# Patient Record
Sex: FEMALE | Race: WHITE | Employment: STUDENT | URBAN - METROPOLITAN AREA
[De-identification: names, ages, dates, MRNs, and addresses within clinical notes are randomized per-mention and may not be internally consistent; named-entity substitution may affect disease eponyms.]

---

## 2017-01-10 ENCOUNTER — OFFICE VISIT (OUTPATIENT)
Dept: PRIMARY CARE CLINIC | Age: 22
End: 2017-01-10

## 2017-01-10 VITALS
BODY MASS INDEX: 21.1 KG/M2 | HEIGHT: 68 IN | DIASTOLIC BLOOD PRESSURE: 69 MMHG | WEIGHT: 139.2 LBS | SYSTOLIC BLOOD PRESSURE: 111 MMHG | OXYGEN SATURATION: 97 % | RESPIRATION RATE: 16 BRPM | TEMPERATURE: 98.2 F | HEART RATE: 66 BPM

## 2017-01-10 DIAGNOSIS — R21 RASH AND NONSPECIFIC SKIN ERUPTION: Primary | ICD-10-CM

## 2017-01-10 NOTE — PROGRESS NOTES
Chief Complaint   Patient presents with    Rash     c/o rash on R wrist that goes around 1st digit of finger,states she has been using lotion to help with discomfort    sx x 1 week, at this time, pt does not have pcp

## 2017-01-10 NOTE — MR AVS SNAPSHOT
Visit Information Date & Time Provider Department Dept. Phone Encounter #  
 1/10/2017  3:30 PM Austyn Colmenares, [address] [phone] [id number] Follow-up Instructions Return if symptoms worsen or fail to improve. Upcoming Health Maintenance Date Due Hepatitis A Peds Age 1-18 (1 of 2 - Standard Series) 4/2/1998 HPV AGE 9Y-26Y (3 of 3 - Female 3 Dose Series) 9/15/2013 INFLUENZA AGE 9 TO ADULT 8/1/2016 DTaP/Tdap/Td series (7 - Td) 3/18/2018 Allergies as of 1/10/2017  Review Complete On: 1/10/2017 By: Austyn Colmenares NP Severity Noted Reaction Type Reactions Hydrogen Peroxide  07/04/2014    Rash Current Immunizations  Reviewed on 3/22/2016 Name Date DTaP 8/19/2002, 8/10/1998, 1997, 1997, 1997 HPV (9-valent) 5/15/2013, 5/9/2012 Hep B Vaccine 5/28/1998, 1997, 1997 Hib 8/10/1998, 1997, 1997, 1997 MMR 3/22/2016, 1/5/2016 Meningococcal (MCV4P) Vaccine 1/5/2016 Poliovirus vaccine 8/19/2002, 5/28/1998, 1997, 1997 Tdap 3/18/2008 Varicella Virus Vaccine 1/21/2003 Not reviewed this visit You Were Diagnosed With   
  
 Codes Comments Rash and nonspecific skin eruption    -  Primary ICD-10-CM: R21 
ICD-9-CM: 782.1 Vitals BP Pulse Temp Resp Height(growth percentile) Weight(growth percentile) 111/69 (47 %/ 65 %)* 66 98.2 °F (36.8 °C) (Oral) 16 5' 8\" (1.727 m) (93 %, Z= 1.46) 139 lb 3.2 oz (63.1 kg) (68 %, Z= 0.47) LMP SpO2 BMI OB Status Smoking Status 12/05/2016 97% 21.17 kg/m2 (44 %, Z= -0.16) Having regular periods Never Smoker *BP percentiles are based on NHBPEP's 4th Report Growth percentiles are based on CDC 2-20 Years data. BMI and BSA Data Body Mass Index Body Surface Area  
 21.17 kg/m 2 1.74 m 2 Preferred Pharmacy Pharmacy Name Phone [address] [place] [address] [place] Oliva Said [phone] Your Updated Medication List  
  
Notice  As of 1/10/2017  4:22 PM  
 You have not been prescribed any medications. We Performed the Following REFERRAL TO DERMATOLOGY [REF19 Custom] Comments:  
 Please evaluate patient for skin rash Follow-up Instructions Return if symptoms worsen or fail to improve. Referral Information Referral ID Referred By Referred To  
  
 [id number] Anam MEDINA  [facility], [place]   
   [address] Phone: [phone] Fax: [phone] Visits Status Start Date End Date 1 New Request 1/10/17 1/10/18 If your referral has a status of pending review or denied, additional information will be sent to support the outcome of this decision. Patient Instructions Rash: Care Instructions Your Care Instructions A rash is any irritation or inflammation of the skin. Rashes have many possible causes, including allergy, infection, illness, heat, and emotional stress. Follow-up care is a key part of your treatment and safety. Be sure to make and go to all appointments, and call your doctor if you are having problems. Its also a good idea to know your test results and keep a list of the medicines you take. How can you care for yourself at home? · Wash the area with water only. Soap can make dryness and itching worse. Pat dry. · Put cold, wet cloths on the rash to reduce itching. · Keep cool, and stay out of the sun. · Leave the rash open to the air as much of the time as possible. · Sometimes petroleum jelly (Vaseline) can help relieve the discomfort caused by a rash. A moisturizing lotion, such as Cetaphil, also may help. Calamine lotion may help for rashes caused by contact with something (such as a plant or soap) that irritated the skin. Use it 3 or 4 times a day. · If your doctor prescribed a cream, use it as directed. If your doctor prescribed medicine, take it exactly as directed. · If your rash itches so badly that it interferes with your normal activities, take an over-the-counter antihistamine, such as diphenhydramine (Benadryl) or loratadine (Claritin). Read and follow all instructions on the label. When should you call for help? Call your doctor now or seek immediate medical care if: 
· You have signs of infection, such as: 
¨ Increased pain, swelling, warmth, or redness. ¨ Red streaks leading from the area. ¨ Pus draining from the area. ¨ A fever. · You have joint pain along with the rash. Watch closely for changes in your health, and be sure to contact your doctor if: 
· Your rash is changing or getting worse. For example, call if you have pain along with the rash, the rash is spreading, or you have new blisters. · You do not get better after 1 week. Where can you learn more? Go to http://kirstin-andrea.info/. Enter C772 in the search box to learn more about \"Rash: Care Instructions. \" Current as of: February 5, 2016 Content Version: 11.1 © 1991-3759 [employer]BOT. Care instructions adapted under license by [employer] (which disclaims liability or warranty for this information). If you have questions about a medical condition or this instruction, always ask your healthcare professional. [place] [address] any warranty or liability for your use of this information. Introducing [facility]! Kelli Wood introduces Selftrade patient portal. Now you can access parts of your medical record, email your doctor's office, and request medication refills online. 1. In your internet browser, go to [email or web address] 2. Click on the First Time User? Click Here link in the Sign In box. You will see the New Member Sign Up page. 3. Enter your WellFX Access Code exactly as it appears below. You will not need to use this code after youve completed the sign-up process. If you do not sign up before the expiration date, you must request a new code. · WellFX Access Code: [email or web address] Expires: 4/10/2017  4:22 PM 
 
4. Enter the last four digits of your Social Security Number (xxxx) and Date of Birth (mm/dd/yyyy) as indicated and click Submit. You will be taken to the next sign-up page. 5. Create a WellFX ID. This will be your WellFX login ID and cannot be changed, so think of one that is secure and easy to remember. 6. Create a WellFX password. You can change your password at any time. 7. Enter your Password Reset Question and Answer. This can be used at a later time if you forget your password. 8. Enter your e-mail address. You will receive e-mail notification when new information is available in [address] 9. Click Sign Up. You can now view and download portions of your medical record. 10. Click the Download Summary menu link to download a portable copy of your medical information. If you have questions, please visit the Frequently Asked Questions section of the WellFX website. Remember, WellFX is NOT to be used for urgent needs. For medical emergencies, dial 911. Now available from your iPhone and Android! Please provide this summary of care documentation to your next provider. If you have any questions after today's visit, please call [phone].

## 2017-01-10 NOTE — PATIENT INSTRUCTIONS

## 2017-01-11 NOTE — PROGRESS NOTES
Chief Complaint   Patient presents with    Rash     c/o rash on R wrist that goes around 1st digit of finger,states she has been using lotion to help with discomfort      she is a 25y.o. year old female who presents for evalution. She has red raised spots on her hands, forearms, side, back and neck. Spots are not blisters. no drainage noted, Her mother states she has eczema, and also gets the same spots every winter from dry skin, however, this winter they are worse. Dermatology was following in the past.    Reviewed PmHx, RxHx, FmHx, SocHx, AllgHx and updated and dated in the chart. Review of Systems - negative except as listed above in the HPI    Objective:     Vitals:    01/10/17 1541   BP: 111/69   Pulse: 66   Resp: 16   Temp: 98.2 °F (36.8 °C)   TempSrc: Oral   SpO2: 97%   Weight: 139 lb 3.2 oz (63.1 kg)   Height: 5' 8\" (1.727 m)       No current outpatient prescriptions on file. No current facility-administered medications for this visit. Physical Examination: General appearance - alert, well appearing, and in no distress  Chest - clear to auscultation, no wheezes, rales or rhonchi, symmetric air entry  Heart - normal rate, regular rhythm, normal S1, S2, no murmurs, rubs, clicks or gallops  Skin - LESIONS NOTED: Rash to bilat hands, arms, few spots on the right side, neck, back. Red, raised, itching, going away then returning in different areas. Assessment/ Plan:   Jane was seen today for rash. Diagnoses and all orders for this visit:    Rash and nonspecific skin eruption  -     REFERRAL TO DERMATOLOGY       Follow-up Disposition:  Return if symptoms worsen or fail to improve. I have discussed the diagnosis with the patient and the intended plan as seen in the above orders. The patient has received an after-visit summary and questions were answered concerning future plans. Pt conveyed understanding of plan.     Medication Side Effects and Warnings were discussed with patient      Stefania Sparrow, NP

## 2017-02-06 ENCOUNTER — OFFICE VISIT (OUTPATIENT)
Dept: PRIMARY CARE CLINIC | Age: 22
End: 2017-02-06

## 2017-02-06 VITALS
HEART RATE: 62 BPM | HEIGHT: 68 IN | BODY MASS INDEX: 21.16 KG/M2 | RESPIRATION RATE: 16 BRPM | TEMPERATURE: 98.2 F | WEIGHT: 139.6 LBS | DIASTOLIC BLOOD PRESSURE: 65 MMHG | OXYGEN SATURATION: 99 % | SYSTOLIC BLOOD PRESSURE: 111 MMHG

## 2017-02-06 DIAGNOSIS — J06.9 VIRAL UPPER RESPIRATORY TRACT INFECTION: Primary | ICD-10-CM

## 2017-02-06 PROBLEM — L30.9 ECZEMA: Status: ACTIVE | Noted: 2017-01-10

## 2017-02-06 LAB
QUICKVUE INFLUENZA TEST: NEGATIVE
S PYO AG THROAT QL: NEGATIVE
VALID INTERNAL CONTROL?: YES
VALID INTERNAL CONTROL?: YES

## 2017-02-06 RX ORDER — HYDROCODONE POLISTIREX AND CHLORPHENIRAMINE POLISTIREX 10; 8 MG/5ML; MG/5ML
1 SUSPENSION, EXTENDED RELEASE ORAL
Qty: 120 ML | Refills: 0 | Status: SHIPPED | OUTPATIENT
Start: 2017-02-06 | End: 2018-03-10 | Stop reason: ALTCHOICE

## 2017-02-06 NOTE — PATIENT INSTRUCTIONS
Rest.  Force fluids. Use steam or humidification to help open your nasal & sinus passages. Honey-lemon tea may help as well as throat sprays or lozenges. Decongestants may help if congested. Upper Respiratory Infection (Cold): Care Instructions  Your Care Instructions    An upper respiratory infection, or URI, is an infection of the nose, sinuses, or throat. URIs are spread by coughs, sneezes, and direct contact. The common cold is the most frequent kind of URI. The flu and sinus infections are other kinds of URIs. Almost all URIs are caused by viruses. Antibiotics won't cure them. But you can treat most infections with home care. This may include drinking lots of fluids and taking over-the-counter pain medicine. You will probably feel better in 4 to 10 days. The doctor has checked you carefully, but problems can develop later. If you notice any problems or new symptoms, get medical treatment right away. Follow-up care is a key part of your treatment and safety. Be sure to make and go to all appointments, and call your doctor if you are having problems. It's also a good idea to know your test results and keep a list of the medicines you take. How can you care for yourself at home? · To prevent dehydration, drink plenty of fluids, enough so that your urine is light yellow or clear like water. Choose water and other caffeine-free clear liquids until you feel better. If you have kidney, heart, or liver disease and have to limit fluids, talk with your doctor before you increase the amount of fluids you drink. · Take an over-the-counter pain medicine, such as acetaminophen (Tylenol), ibuprofen (Advil, Motrin), or naproxen (Aleve). Read and follow all instructions on the label. · Before you use cough and cold medicines, check the label. These medicines may not be safe for young children or for people with certain health problems.   · Be careful when taking over-the-counter cold or flu medicines and Tylenol at the same time. Many of these medicines have acetaminophen, which is Tylenol. Read the labels to make sure that you are not taking more than the recommended dose. Too much acetaminophen (Tylenol) can be harmful. · Get plenty of rest.  · Do not smoke or allow others to smoke around you. If you need help quitting, talk to your doctor about stop-smoking programs and medicines. These can increase your chances of quitting for good. When should you call for help? Call 911 anytime you think you may need emergency care. For example, call if:  · You have severe trouble breathing. Call your doctor now or seek immediate medical care if:  · You seem to be getting much sicker. · You have new or worse trouble breathing. · You have a new or higher fever. · You have a new rash. Watch closely for changes in your health, and be sure to contact your doctor if:  · You have a new symptom, such as a sore throat, an earache, or sinus pain. · You cough more deeply or more often, especially if you notice more mucus or a change in the color of your mucus. · You do not get better as expected. Where can you learn more? Go to http://kirstin-andrea.info/. Enter H097 in the search box to learn more about \"Upper Respiratory Infection (Cold): Care Instructions. \"  Current as of: June 30, 2016  Content Version: 11.1  © 4592-0112 Healthwise, Incorporated. Care instructions adapted under license by [employer] (which disclaims liability or warranty for this information). If you have questions about a medical condition or this instruction, always ask your healthcare professional. [place] [address] any warranty or liability for your use of this information.

## 2017-02-06 NOTE — PROGRESS NOTES
Chief Complaint   Patient presents with    Sore Throat   c/o sore throat and glands swollen x 1 day, has tried motrin and tylenol to help with discomfort

## 2017-02-06 NOTE — LETTER
NOTIFICATION RETURN TO WORK / SCHOOL 
 
2/6/2017 1:19 PM 
 
Ms. [address] [facility] To Whom It May Concern: 
 
Jane KIRK [place] Willi is currently under the care of [address] [place] [address]. She will return to work/school on: 02/07/2017 If there are questions or concerns please have the patient contact our office. Sincerely, Henrry Mccloud MD

## 2017-02-06 NOTE — MR AVS SNAPSHOT
Visit Information Date & Time Provider Department Dept. Phone Encounter #  
 2/6/2017 12:30 PM Levon Beltran, [address] [phone] Follow-up Instructions Return if symptoms worsen or fail to improve. Upcoming Health Maintenance Date Due Hepatitis A Peds Age 1-18 (1 of 2 - Standard Series) 4/2/1998 HPV AGE 9Y-26Y (3 of 3 - Female 3 Dose Series) 9/15/2013 INFLUENZA AGE 9 TO ADULT 8/1/2016 DTaP/Tdap/Td series (7 - Td) 3/18/2018 Allergies as of 2/6/2017  Review Complete On: 2/6/2017 By: Levon Beltran MD  
  
 Severity Noted Reaction Type Reactions Hydrogen Peroxide  07/04/2014    Rash Current Immunizations  Reviewed on 3/22/2016 Name Date DTaP 8/19/2002, 8/10/1998, 1997, 1997, 1997 HPV (9-valent) 5/15/2013, 5/9/2012 Hep B Vaccine 5/28/1998, 1997, 1997 Hib 8/10/1998, 1997, 1997, 1997 MMR 3/22/2016, 1/5/2016 Meningococcal (MCV4P) Vaccine 1/5/2016 Poliovirus vaccine 8/19/2002, 5/28/1998, 1997, 1997 Tdap 3/18/2008 Varicella Virus Vaccine 1/21/2003 Not reviewed this visit You Were Diagnosed With   
  
 Codes Comments Viral upper respiratory tract infection    -  Primary ICD-10-CM: J06.9, B97.89 ICD-9-CM: 465.9 Vitals BP Pulse Temp Resp Height(growth percentile) Weight(growth percentile) 111/65 (48 %/ 51 %)* 62 98.2 °F (36.8 °C) (Oral) 16 5' 8\" (1.727 m) (93 %, Z= 1.46) 139 lb 9.6 oz (63.3 kg) (69 %, Z= 0.48) LMP SpO2 BMI OB Status Smoking Status 02/01/2017 (Exact Date) 99% 21.23 kg/m2 (44 %, Z= -0.15) Having regular periods Never Smoker *BP percentiles are based on NHBPEP's 4th Report Growth percentiles are based on CDC 2-20 Years data. BMI and BSA Data Body Mass Index Body Surface Area  
 21.23 kg/m 2 1.74 m 2 Preferred Pharmacy Pharmacy Name Phone [address] [place] [address] [place] Oliva Said [phone] Your Updated Medication List  
  
   
This list is accurate as of: 2/6/17  1:19 PM.  Always use your most recent med list.  
  
  
  
  
 chlorpheniramine-HYDROcodone 10-8 mg/5 mL suspension Commonly known as:  Terryann Deiters Take 5 mL by mouth every twelve (12) hours as needed for Cough. Max Daily Amount: 10 mL. Prescriptions Printed Refills  
 chlorpheniramine-HYDROcodone (TUSSIONEX) 10-8 mg/5 mL suspension 0 Sig: Take 5 mL by mouth every twelve (12) hours as needed for Cough. Max Daily Amount: 10 mL. Class: Print Route: Oral  
  
We Performed the Following AMB POC RAPID INFLUENZA TEST [91211 CPT(R)] AMB POC RAPID STREP A [12536 CPT(R)] Follow-up Instructions Return if symptoms worsen or fail to improve. Patient Instructions Rest.  Force fluids. Use steam or humidification to help open your nasal & sinus passages. Honey-lemon tea may help as well as throat sprays or lozenges. Decongestants may help if congested. Upper Respiratory Infection (Cold): Care Instructions Your Care Instructions An upper respiratory infection, or URI, is an infection of the nose, sinuses, or throat. URIs are spread by coughs, sneezes, and direct contact. The common cold is the most frequent kind of URI. The flu and sinus infections are other kinds of URIs. Almost all URIs are caused by viruses. Antibiotics won't cure them. But you can treat most infections with home care. This may include drinking lots of fluids and taking over-the-counter pain medicine. You will probably feel better in 4 to 10 days. The doctor has checked you carefully, but problems can develop later. If you notice any problems or new symptoms, get medical treatment right away. Follow-up care is a key part of your treatment and safety.  Be sure to make and go to all appointments, and call your doctor if you are having problems. It's also a good idea to know your test results and keep a list of the medicines you take. How can you care for yourself at home? · To prevent dehydration, drink plenty of fluids, enough so that your urine is light yellow or clear like water. Choose water and other caffeine-free clear liquids until you feel better. If you have kidney, heart, or liver disease and have to limit fluids, talk with your doctor before you increase the amount of fluids you drink. · Take an over-the-counter pain medicine, such as acetaminophen (Tylenol), ibuprofen (Advil, Motrin), or naproxen (Aleve). Read and follow all instructions on the label. · Before you use cough and cold medicines, check the label. These medicines may not be safe for young children or for people with certain health problems. · Be careful when taking over-the-counter cold or flu medicines and Tylenol at the same time. Many of these medicines have acetaminophen, which is Tylenol. Read the labels to make sure that you are not taking more than the recommended dose. Too much acetaminophen (Tylenol) can be harmful. · Get plenty of rest. 
· Do not smoke or allow others to smoke around you. If you need help quitting, talk to your doctor about stop-smoking programs and medicines. These can increase your chances of quitting for good. When should you call for help? Call 911 anytime you think you may need emergency care. For example, call if: 
· You have severe trouble breathing. Call your doctor now or seek immediate medical care if: 
· You seem to be getting much sicker. · You have new or worse trouble breathing. · You have a new or higher fever. · You have a new rash. Watch closely for changes in your health, and be sure to contact your doctor if: 
· You have a new symptom, such as a sore throat, an earache, or sinus pain. · You cough more deeply or more often, especially if you notice more mucus or a change in the color of your mucus. · You do not get better as expected. Where can you learn more? Go to http://kirstin-andrea.info/. Enter X542 in the search box to learn more about \"Upper Respiratory Infection (Cold): Care Instructions. \" Current as of: June 30, 2016 Content Version: 11.1 © 9418-9669 [email or web address].com. Care instructions adapted under license by [employer] (which disclaims liability or warranty for this information). If you have questions about a medical condition or this instruction, always ask your healthcare professional. [address] any warranty or liability for your use of this information. Introducing [facility]! Justin Dorman introduces AisleFinder patient portal. Now you can access parts of your medical record, email your doctor's office, and request medication refills online. 1. In your internet browser, go to [email or web address] 2. Click on the First Time User? Click Here link in the Sign In box. You will see the New Member Sign Up page. 3. Enter your AisleFinder Access Code exactly as it appears below. You will not need to use this code after youve completed the sign-up process. If you do not sign up before the expiration date, you must request a new code. · AisleFinder Access Code: [email or web address] Expires: 4/10/2017  4:22 PM 
 
4. Enter the last four digits of your Social Security Number (xxxx) and Date of Birth (mm/dd/yyyy) as indicated and click Submit. You will be taken to the next sign-up page. 5. Create a CBRITEt ID. This will be your AisleFinder login ID and cannot be changed, so think of one that is secure and easy to remember. 6. Create a AisleFinder password. You can change your password at any time. 7. Enter your Password Reset Question and Answer.  This can be used at a later time if you forget your password. 8. Enter your e-mail address. You will receive e-mail notification when new information is available in [address] 9. Click Sign Up. You can now view and download portions of your medical record. 10. Click the Download Summary menu link to download a portable copy of your medical information. If you have questions, please visit the Frequently Asked Questions section of the IncreaseCard website. Remember, IncreaseCard is NOT to be used for urgent needs. For medical emergencies, dial 911. Now available from your iPhone and Android! Please provide this summary of care documentation to your next provider. If you have any questions after today's visit, please call [phone].

## 2017-02-06 NOTE — PROGRESS NOTES
Chief Complaint   Patient presents with    Sore Throat       HPI:  25year old female who developed sore throat, subjective fevers and chills last night along with cough, myalgias, malaise, and fatigue. She has not gotten a flu shot this year. Appetite a little less, but no nausea or vomiting. Review of Systems - no recent weight loss/gain, chest pain, shortness of breath, nausea, vomiting, diarrhea, urinary frequency/urgency/dysuria. Otherwise, ROS negative except as per HPI    Past Medical History   Diagnosis Date    Spider bite 7/2014       Past Surgical History   Procedure Laterality Date    Hx heent       ear tubes       Family History   Problem Relation Age of Onset    Asthma Mother     Elevated Lipids Father     No Known Problems Sister     No Known Problems Brother     No Known Problems Maternal Aunt     No Known Problems Maternal Uncle     No Known Problems Paternal Aunt     No Known Problems Paternal Uncle     Diabetes Maternal Grandmother     Hypertension Maternal Grandfather     Hypertension Paternal Grandmother        Social History     Social History    Marital status: SINGLE     Spouse name: N/A    Number of children: N/A    Years of education: N/A     Occupational History    Not on file. Social History Main Topics    Smoking status: Never Smoker    Smokeless tobacco: Never Used    Alcohol use Yes      Comment: occasional    Drug use: No    Sexual activity: Yes     Birth control/ protection: Pill     Other Topics Concern    Not on file     Social History Narrative       No current outpatient prescriptions on file prior to visit. No current facility-administered medications on file prior to visit. Allergies   Allergen Reactions    Hydrogen Peroxide Rash       PE:    General:  Well-developed, well-nourished female in no apparent distress  HEENT:  Normocephalic, atraumatic, Pupils are equal, round, & reactive to light & accommodation.   Extraocular movements intact. TM's normal, external auditory exam normal.  No sinus tenderness. Oropharynx grossly normal.  No tonsillar enlargement, erythema, or exudates. Neck:  Supple, nontender, full ROM. No lymphadenopathy. No thyromegaly. Chest:  clear to auscultation without rales, rhonchi, or wheezes. CV:  Regular rate & rhythm without murmurs, gallops, clicks, or rubs. Abdomen:  soft, nontender, nondistended, normoactive bowel sounds, no organomegaly. Extremities:  No edema, clubbing, or cyanosis. Full ROM, nontender. Orders Placed This Encounter    AMB POC RAPID INFLUENZA TEST    AMB POC RAPID STREP A    chlorpheniramine-HYDROcodone (TUSSIONEX) 10-8 mg/5 mL suspension     Sig: Take 5 mL by mouth every twelve (12) hours as needed for Cough. Max Daily Amount: 10 mL. Dispense:  120 mL     Refill:  0   POC flu & Strep are both negative    1. Viral upper respiratory tract infection    - AMB POC RAPID INFLUENZA TEST  - AMB POC RAPID STREP A  - chlorpheniramine-HYDROcodone (TUSSIONEX) 10-8 mg/5 mL suspension; Take 5 mL by mouth every twelve (12) hours as needed for Cough. Max Daily Amount: 10 mL. Dispense: 120 mL; Refill: 0    Follow-up Disposition:  Return if symptoms worsen or fail to improve.     Bryce Fitzgerald MD

## 2018-03-10 ENCOUNTER — OFFICE VISIT (OUTPATIENT)
Dept: PRIMARY CARE CLINIC | Age: 23
End: 2018-03-10

## 2018-03-10 VITALS
OXYGEN SATURATION: 99 % | HEART RATE: 62 BPM | BODY MASS INDEX: 21.25 KG/M2 | TEMPERATURE: 98 F | RESPIRATION RATE: 17 BRPM | SYSTOLIC BLOOD PRESSURE: 122 MMHG | DIASTOLIC BLOOD PRESSURE: 70 MMHG | WEIGHT: 140.2 LBS | HEIGHT: 68 IN

## 2018-03-10 DIAGNOSIS — S53.401A SPRAIN OF RIGHT ELBOW, INITIAL ENCOUNTER: Primary | ICD-10-CM

## 2018-03-10 NOTE — PROGRESS NOTES
Chief Complaint   Patient presents with    Elbow Pain   pt c/o not being able to extend R elbow and intermittent R elbow pain x 3-4 days, pt denies any injury to arm, pt states she has been taking ibuprofen to help with discomfort. This note will not be viewable in [address]

## 2018-03-10 NOTE — PROGRESS NOTES
Subjective:      Jane Ortiz is a 23 y.o. female seen for evaluation and treatment of a right elbow injury. This is evaluated as a personal injury. The injury was sustained 4 days ago, and occurred while she was sleeping. She did not hear or sense a pop or snap at the time of the injury. She is not sure how it was injured, and possibly injured it in her sleep. The patient notes pain and mild swelling since the injury. She has not injured this site in the past. After 4 days, she took one dose of Motrin 400 mg, and it has not resolved so she is here today for evaluation and treatment. She has not needed to stop doing her daily activities or work, although she has been unable to put her hair up in a ponytail.     Patient Active Problem List   Diagnosis Code    Eczema L30.9     Patient Active Problem List    Diagnosis Date Noted    Eczema 01/10/2017       Allergies   Allergen Reactions    Hydrogen Peroxide Rash     Past Medical History:   Diagnosis Date    Spider bite 7/2014     Past Surgical History:   Procedure Laterality Date    HX HEENT      ear tubes     Family History   Problem Relation Age of Onset    Asthma Mother     Elevated Lipids Father     No Known Problems Sister     No Known Problems Brother     No Known Problems Maternal Aunt     No Known Problems Maternal Uncle     No Known Problems Paternal Aunt     No Known Problems Paternal Uncle     Diabetes Maternal Grandmother     Hypertension Maternal Grandfather     Hypertension Paternal Grandmother      Social History   Substance Use Topics    Smoking status: Never Smoker    Smokeless tobacco: Never Used    Alcohol use Yes      Comment: occasional        Objective:     Visit Vitals    /70 (BP 1 Location: Left arm, BP Patient Position: Sitting)    Pulse 62    Temp 98 °F (36.7 °C) (Oral)    Resp 17    Ht 5' 8\" (1.727 m)    Wt 140 lb 3.2 oz (63.6 kg)    SpO2 99%    BMI 21.32 kg/m2      Appearance: alert, well appearing, and in no distress. Musculoskeletal exam: abnormal exam of right elbow: no swelling noted or visible however patient states it feels swollen. She is unable to fully extend without pain in the elbow. Imaging  is not indicated    Assessment/Plan:       ICD-10-CM ICD-9-CM    1. Sprain of right elbow, initial encounter S53.401A 841.9    Motrin 600 mg TID with food for 3-5 days, along with ice 4-6 x daily. Use arm splint if needed to rest and elevate. The patient is advised to use elbow splint, rest, apply cold or ice intermittently, elevate affected extremity, gradually reintroduce usual activities, avoid heavy lifting until better and return PRN if symptoms persist or worsen.

## 2018-03-10 NOTE — PATIENT INSTRUCTIONS
Elbow Sprain: Care Instructions  Your Care Instructions    An elbow sprain occurs when you overstretch or tear the ligaments around your elbow. Ligaments are the tough tissues that connect one bone to another. A sprain can happen when you fall or when you play sports or do chores around the house. Most sprains will heal with some treatment at home. Follow-up care is a key part of your treatment and safety. Be sure to make and go to all appointments, and call your doctor if you are having problems. It's also a good idea to know your test results and keep a list of the medicines you take. How can you care for yourself at home? · Follow your doctor's directions for wearing a splint, elbow pad, sling, or elastic bandage. Wrapping the elbow may help reduce or prevent swelling. · Rest and protect your elbow. Do not do any activity that hurts your elbow. · Apply ice or a cold pack to your elbow for 10 to 20 minutes at a time to reduce swelling. Try this every 1 to 2 hours for 3 days (when you are awake) or until the swelling goes down. Put a thin cloth between the ice and your skin. · After 2 or 3 days, if your swelling is gone, apply a heating pad on low or a warm cloth to your elbow. This helps keep your arm flexible. Some doctors suggest that you go back and forth between hot and cold. Keep the splint dry. · Prop up your elbow on pillows while you apply ice or anytime you sit or lie down. Try to keep the elbow at or above the level of your heart to help reduce swelling. · Take pain medicines exactly as directed. ¨ If the doctor gave you a prescription medicine for pain, take it as prescribed. ¨ If you are not taking a prescription pain medicine, ask your doctor if you can take an over-the-counter medicine. · Return to your usual level of activity slowly. When should you call for help? Call your doctor now or seek immediate medical care if:  · Your pain is worse.   · You have new or increased swelling in your elbow or hand. · You cannot bend your arm. · You have a fever. · Your elbow looks red. · You have tingling, weakness, or numbness in your elbow, hand, or fingers. Watch closely for changes in your health, and be sure to contact your doctor if:  · Your pain is not better after 2 weeks. Where can you learn more? Go to http://kirstin-andrea.info/. Enter R973 in the search box to learn more about \"Elbow Sprain: Care Instructions. \"  Current as of: March 21, 2017  Content Version: 11.4  © 2576-3081 [employer]. Care instructions adapted under license by [employer] (which disclaims liability or warranty for this information). If you have questions about a medical condition or this instruction, always ask your healthcare professional. [address] any warranty or liability for your use of this information.

## 2018-03-10 NOTE — MR AVS SNAPSHOT
[address] 
[phone] Patient: Tammy Hedrick MRN: [id number] :1995 Visit Information Date & Time Provider Department Dept. Phone Encounter #  
 3/10/2018  1:15 PM Maddie Kaur, [address] [facility] [phone] [id number] Follow-up Instructions Return if symptoms worsen or fail to improve. Upcoming Health Maintenance Date Due Hepatitis A Peds Age 1-18 (1 of 2 - Standard Series) 1998 HPV AGE 9Y-26Y (3 of 3 - Female 3 Dose Series) 9/15/2013 DTaP/Tdap/Td series (7 - Td) 3/18/2018 Allergies as of 3/10/2018  Review Complete On: 3/10/2018 By: Abad Haro LPN Severity Noted Reaction Type Reactions Hydrogen Peroxide  2014    Rash Current Immunizations  Reviewed on 3/22/2016 Name Date DTaP 2002, 8/10/1998, 1997, 1997, 1997 HPV (9-valent) 5/15/2013, 2012 Hep B Vaccine 1998, 1997, 1997 Hib 8/10/1998, 1997, 1997, 1997 MMR 3/22/2016, 2016 Meningococcal (MCV4P) Vaccine 2016 Poliovirus vaccine 2002, 1998, 1997, 1997 Tdap 3/18/2008 Varicella Virus Vaccine 2003 Not reviewed this visit You Were Diagnosed With   
  
 Codes Comments Sprain of right elbow, initial encounter    -  Primary ICD-10-CM: S53.401A ICD-9-CM: 247. 9 Vitals BP Pulse Temp Resp Height(growth percentile) Weight(growth percentile) 122/70 (BP 1 Location: Left arm, BP Patient Position: Sitting) 62 98 °F (36.7 °C) (Oral) 17 5' 8\" (1.727 m) 140 lb 3.2 oz (63.6 kg) SpO2 BMI OB Status Smoking Status 99% 21.32 kg/m2 Having regular periods Never Smoker Vitals History BMI and BSA Data Body Mass Index Body Surface Area  
 21.32 kg/m 2 1.75 m 2 Preferred Pharmacy Pharmacy Name Phone [facility] [address], [address] [place] [address] Kevin Corcoran [phone] Your Updated Medication List  
  
Notice  As of 3/10/2018  1:42 PM  
 You have not been prescribed any medications. Follow-up Instructions Return if symptoms worsen or fail to improve. Patient Instructions Elbow Sprain: Care Instructions Your Care Instructions An elbow sprain occurs when you overstretch or tear the ligaments around your elbow. Ligaments are the tough tissues that connect one bone to another. A sprain can happen when you fall or when you play sports or do chores around the house. Most sprains will heal with some treatment at home. Follow-up care is a key part of your treatment and safety. Be sure to make and go to all appointments, and call your doctor if you are having problems. It's also a good idea to know your test results and keep a list of the medicines you take. How can you care for yourself at home? · Follow your doctor's directions for wearing a splint, elbow pad, sling, or elastic bandage. Wrapping the elbow may help reduce or prevent swelling. · Rest and protect your elbow. Do not do any activity that hurts your elbow. · Apply ice or a cold pack to your elbow for 10 to 20 minutes at a time to reduce swelling. Try this every 1 to 2 hours for 3 days (when you are awake) or until the swelling goes down. Put a thin cloth between the ice and your skin. · After 2 or 3 days, if your swelling is gone, apply a heating pad on low or a warm cloth to your elbow. This helps keep your arm flexible. Some doctors suggest that you go back and forth between hot and cold. Keep the splint dry. · Prop up your elbow on pillows while you apply ice or anytime you sit or lie down. Try to keep the elbow at or above the level of your heart to help reduce swelling. · Take pain medicines exactly as directed.  
¨ If the doctor gave you a prescription medicine for pain, take it as prescribed. ¨ If you are not taking a prescription pain medicine, ask your doctor if you can take an over-the-counter medicine. · Return to your usual level of activity slowly. When should you call for help? Call your doctor now or seek immediate medical care if: 
· Your pain is worse. · You have new or increased swelling in your elbow or hand. · You cannot bend your arm. · You have a fever. · Your elbow looks red. · You have tingling, weakness, or numbness in your elbow, hand, or fingers. Watch closely for changes in your health, and be sure to contact your doctor if: 
· Your pain is not better after 2 weeks. Where can you learn more? Go to http://kirstin-andrea.info/. Enter Q439 in the search box to learn more about \"Elbow Sprain: Care Instructions. \" Current as of: March 21, 2017 Content Version: 11.4 © 2434-3377 [employer]. Care instructions adapted under license by [employer] (which disclaims liability or warranty for this information). If you have questions about a medical condition or this instruction, always ask your healthcare professional. [place] [address] any warranty or liability for your use of this information. Introducing [facility]! [facility] introduces Entrustet patient portal. Now you can access parts of your medical record, email your doctor's office, and request medication refills online. 1. In your internet browser, go to [email or web address] 2. Click on the First Time User? Click Here link in the Sign In box. You will see the New Member Sign Up page. 3. Enter your Entrustet Access Code exactly as it appears below. You will not need to use this code after youve completed the sign-up process. If you do not sign up before the expiration date, you must request a new code. · Entrustet Access Code: [email or web address] Expires: 6/8/2018  1:41 PM 
 
 4. Enter the last four digits of your Social Security Number (xxxx) and Date of Birth (mm/dd/yyyy) as indicated and click Submit. You will be taken to the next sign-up page. 5. Create a PsychSignal ID. This will be your PsychSignal login ID and cannot be changed, so think of one that is secure and easy to remember. 6. Create a PsychSignal password. You can change your password at any time. 7. Enter your Password Reset Question and Answer. This can be used at a later time if you forget your password. 8. Enter your e-mail address. You will receive e-mail notification when new information is available in [address] 9. Click Sign Up. You can now view and download portions of your medical record. 10. Click the Download Summary menu link to download a portable copy of your medical information. If you have questions, please visit the Frequently Asked Questions section of the PsychSignal website. Remember, PsychSignal is NOT to be used for urgent needs. For medical emergencies, dial 911. Now available from your iPhone and Android! Please provide this summary of care documentation to your next provider. If you have any questions after today's visit, please call [phone].

## 2018-11-19 ENCOUNTER — OFFICE VISIT (OUTPATIENT)
Dept: PRIMARY CARE CLINIC | Age: 23
End: 2018-11-19

## 2018-11-19 VITALS
HEIGHT: 68 IN | WEIGHT: 145.6 LBS | BODY MASS INDEX: 22.07 KG/M2 | HEART RATE: 56 BPM | DIASTOLIC BLOOD PRESSURE: 72 MMHG | SYSTOLIC BLOOD PRESSURE: 112 MMHG | TEMPERATURE: 97.8 F | OXYGEN SATURATION: 98 % | RESPIRATION RATE: 20 BRPM

## 2018-11-19 DIAGNOSIS — J06.9 VIRAL URI WITH COUGH: Primary | ICD-10-CM

## 2018-11-19 DIAGNOSIS — R05.9 COUGH: ICD-10-CM

## 2018-11-19 NOTE — LETTER
NOTIFICATION RETURN TO WORK / SCHOOL 
 
11/19/2018 11:55 AM 
 
Ms. [address] [employer] [facility] To Whom It May Concern: 
 
Jane Damon is currently under the care of [address] [place] [address]. She will return to work/school on: 11/21/18 If there are questions or concerns please have the patient contact our office.  
 
 
 
Sincerely, 
 
 
Rubi Marcus NP

## 2018-11-19 NOTE — PATIENT INSTRUCTIONS
Cough: Care Instructions  Your Care Instructions    A cough is your body's response to something that bothers your throat or airways. Many things can cause a cough. You might cough because of a cold or the flu, bronchitis, or asthma. Smoking, postnasal drip, allergies, and stomach acid that backs up into your throat also can cause coughs. A cough is a symptom, not a disease. Most coughs stop when the cause, such as a cold, goes away. You can take a few steps at home to cough less and feel better. Follow-up care is a key part of your treatment and safety. Be sure to make and go to all appointments, and call your doctor if you are having problems. It's also a good idea to know your test results and keep a list of the medicines you take. How can you care for yourself at home? · Drink lots of water and other fluids. This helps thin the mucus and soothes a dry or sore throat. Honey or lemon juice in hot water or tea may ease a dry cough. · Take cough medicine as directed by your doctor. · Prop up your head on pillows to help you breathe and ease a dry cough. · Try cough drops to soothe a dry or sore throat. Cough drops don't stop a cough. Medicine-flavored cough drops are no better than candy-flavored drops or hard candy. · Do not smoke. Avoid secondhand smoke. If you need help quitting, talk to your doctor about stop-smoking programs and medicines. These can increase your chances of quitting for good. When should you call for help? Call 911 anytime you think you may need emergency care.  For example, call if:    · You have severe trouble breathing.    Call your doctor now or seek immediate medical care if:    · You cough up blood.     · You have new or worse trouble breathing.     · You have a new or higher fever.     · You have a new rash.    Watch closely for changes in your health, and be sure to contact your doctor if:    · You cough more deeply or more often, especially if you notice more mucus or a change in the color of your mucus.     · You have new symptoms, such as a sore throat, an earache, or sinus pain.     · You do not get better as expected. Where can you learn more? Go to http://kirstin-andrea.info/. Enter D279 in the search box to learn more about \"Cough: Care Instructions. \"  Current as of: December 6, 2017  Content Version: 11.8  © 8835-6989 [employer]. Care instructions adapted under license by [employer] (which disclaims liability or warranty for this information). If you have questions about a medical condition or this instruction, always ask your healthcare professional. [address] any warranty or liability for your use of this information.

## 2018-11-20 NOTE — PROGRESS NOTES
Subjective:   Jane Ocampo is a 26 y.o. female who complains of hoarseness and loss of voice for 6 days. She denies a history of shortness of breath and wheezing. Her symptoms have gradually improved over the last 1 day. She denies fever. Patient does not smoke cigarettes. Relevant PMH: No pertinent additional PMH. Objective:      Visit Vitals  /72 (BP 1 Location: Left arm, BP Patient Position: Sitting)   Pulse (!) 56   Temp 97.8 °F (36.6 °C) (Oral)   Resp 20   Ht 5' 8\" (1.727 m)   Wt 145 lb 9.6 oz (66 kg)   SpO2 98%   BMI 22.14 kg/m²      Appears alert, well appearing, and in no distress. Ears: bilateral TM's and external ear canals normal  Oropharynx: erythematous  Neck: supple, no significant adenopathy  Lungs: clear to auscultation, no wheezes, rales or rhonchi, symmetric air entry  The abdomen is soft without tenderness or hepatosplenomegaly. Rapid Strep test is not done    Assessment/Plan:   viral upper respiratory illness  Per orders. Gargle, use acetaminophen or other OTC analgesic, and take Rx fully as prescribed. Call if other family members develop similar symptoms. See prn. ICD-10-CM ICD-9-CM    1. Viral URI with cough J06.9 465.9     B97.89     2. Cough R05 786.2 REFERRAL TO PRIMARY CARE   .

## 2019-06-12 ENCOUNTER — OFFICE VISIT (OUTPATIENT)
Dept: PRIMARY CARE CLINIC | Age: 24
End: 2019-06-12

## 2019-06-12 VITALS
BODY MASS INDEX: 21.67 KG/M2 | DIASTOLIC BLOOD PRESSURE: 54 MMHG | HEART RATE: 55 BPM | TEMPERATURE: 98.1 F | WEIGHT: 143 LBS | SYSTOLIC BLOOD PRESSURE: 96 MMHG | OXYGEN SATURATION: 97 % | HEIGHT: 68 IN | RESPIRATION RATE: 18 BRPM

## 2019-06-12 DIAGNOSIS — J06.9 VIRAL URI WITH COUGH: ICD-10-CM

## 2019-06-12 DIAGNOSIS — H69.81 ACUTE DYSFUNCTION OF RIGHT EUSTACHIAN TUBE: Primary | ICD-10-CM

## 2019-06-12 NOTE — PROGRESS NOTES
Subjective:   Jane Weller is a 27 y.o. female who complains of right ear pain, fullness, diminished hearing for 1 day, stable since that time. Going out of town and wanted to get checked. Pt reports having cough for 2 weeks. Had nasal congestion and sore throat as well which has since resolved. Denies any fevers. She denies a history of shortness of breath and wheezing. Evaluation to date: none. Treatment to date: OTC products. Patient does not smoke cigarettes. Relevant PMH:   Past Medical History:   Diagnosis Date    Spider bite 7/2014     Past Surgical History:   Procedure Laterality Date    HX HEENT      ear tubes     Allergies   Allergen Reactions    Hydrogen Peroxide Rash         Review of Systems  Pertinent items are noted in HPI. Objective:     Visit Vitals  BP 96/54 (BP 1 Location: Left arm, BP Patient Position: Sitting)   Pulse (!) 55   Temp 98.1 °F (36.7 °C) (Oral)   Resp 18   Ht 5' 8\" (1.727 m)   Wt 143 lb (64.9 kg)   LMP 06/09/2019   SpO2 97%   BMI 21.74 kg/m²     General:  alert, cooperative, no distress   Eyes: negative   Ears: normal TM's (mild scarring Right TM) and external ear canals AU   Sinuses: Normal paranasal sinuses without tenderness   Mouth:  Lips, mucosa, and tongue normal. Teeth and gums normal and normal findings: oropharynx pink & moist without lesions or evidence of thrush   Neck: supple, symmetrical, trachea midline and no adenopathy. Heart: S1 and S2 normal, no murmurs noted. Lungs: clear to auscultation bilaterally          Assessment/Plan:       ICD-10-CM ICD-9-CM    1. Acute dysfunction of right eustachian tube H69.81 381.81    2. Viral URI with cough J06.9 465.9     B97.89       Recommend decongestant & Flonase. Suggested symptomatic OTC remedies. RTC prn. Eliza Berger NP  This note will not be viewable in [address]

## 2019-06-12 NOTE — PROGRESS NOTES
Identified pt with two pt identifiers(name and ). Reviewed record in preparation for visit and have obtained necessary documentation. Chief Complaint   Patient presents with    Ear Pain     right ear 4/10      Visit Vitals  BP 96/54 (BP 1 Location: Left arm, BP Patient Position: Sitting)   Pulse (!) 55   Temp 98.1 °F (36.7 °C) (Oral)   Resp 18   Ht 5' 8\" (1.727 m)   Wt 143 lb (64.9 kg)   LMP 2019   SpO2 97%   BMI 21.74 kg/m²       Health Maintenance Due   Topic    HPV Age 9Y-34Y (4 - Female 3-dose series)    DTaP/Tdap/Td series (7 - Td)    PAP AKA CERVICAL CYTOLOGY        Coordination of Care Questionnaire:  :   1) Have you been to an emergency room, urgent care, or hospitalized since your last visit? If yes, where when, and reason for visit? no       2. Have seen or consulted any other health care provider since your last visit? If yes, where when, and reason for visit? NO      3) Do you have an Advanced Directive/ Living Will in place? NO  If yes, do we have a copy on file NO  If no, would you like information NO    Patient is accompanied by self I have received verbal consent from Grace Barton to discuss any/all medical information while they are present in the room.

## 2019-06-12 NOTE — PATIENT INSTRUCTIONS
Viral Respiratory Infection: Care Instructions Your Care Instructions Viruses are very small organisms. They grow in number after they enter your body. There are many types that cause different illnesses, such as colds and the mumps. The symptoms of a viral respiratory infection often start quickly. They include a fever, sore throat, and runny nose. You may also just not feel well. Or you may not want to eat much. Most viral respiratory infections are not serious. They usually get better with time and self-care. Antibiotics are not used to treat a viral infection. That's because antibiotics will not help cure a viral illness. In some cases, antiviral medicine can help your body fight a serious viral infection. Follow-up care is a key part of your treatment and safety. Be sure to make and go to all appointments, and call your doctor if you are having problems. It's also a good idea to know your test results and keep a list of the medicines you take. How can you care for yourself at home? · Rest as much as possible until you feel better. · Be safe with medicines. Take your medicine exactly as prescribed. Call your doctor if you think you are having a problem with your medicine. You will get more details on the specific medicine your doctor prescribes. · Take an over-the-counter pain medicine, such as acetaminophen (Tylenol), ibuprofen (Advil, Motrin), or naproxen (Aleve), as needed for pain and fever. Read and follow all instructions on the label. Do not give aspirin to anyone younger than 20. It has been linked to Reye syndrome, a serious illness. · Drink plenty of fluids, enough so that your urine is light yellow or clear like water. Hot fluids, such as tea or soup, may help relieve congestion in your nose and throat. If you have kidney, heart, or liver disease and have to limit fluids, talk with your doctor before you increase the amount of fluids you drink. · Try to clear mucus from your lungs by breathing deeply and coughing. · Gargle with warm salt water once an hour. This can help reduce swelling and throat pain. Use 1 teaspoon of salt mixed in 1 cup of warm water. · Do not smoke or allow others to smoke around you. If you need help quitting, talk to your doctor about stop-smoking programs and medicines. These can increase your chances of quitting for good. To avoid spreading the virus · Cough or sneeze into a tissue. Then throw the tissue away. · If you don't have a tissue, use your hand to cover your cough or sneeze. Then clean your hand. You can also cough into your sleeve. · Wash your hands often. Use soap and warm water. Wash for 15 to 20 seconds each time. · If you don't have soap and water near you, you can clean your hands with alcohol wipes or gel. When should you call for help? Call your doctor now or seek immediate medical care if: 
  · You have a new or higher fever.  
  · Your fever lasts more than 48 hours.  
  · You have trouble breathing.  
  · You have a fever with a stiff neck or a severe headache.  
  · You are sensitive to light.  
  · You feel very sleepy or confused.  
 Watch closely for changes in your health, and be sure to contact your doctor if: 
  · You do not get better as expected. Where can you learn more? Go to http://kirstin-andrea.info/. Enter P570 in the search box to learn more about \"Viral Respiratory Infection: Care Instructions. \" Current as of: September 5, 2018 Content Version: 11.9 © 9329-4173 [employer]). Care instructions adapted under license by [employer] (which disclaims liability or warranty for this information). If you have questions about a medical condition or this instruction, always ask your healthcare professional. [address] any warranty or liability for your use of this information.

## 2019-06-28 ENCOUNTER — OFFICE VISIT (OUTPATIENT)
Dept: PRIMARY CARE CLINIC | Age: 24
End: 2019-06-28

## 2019-06-28 VITALS
RESPIRATION RATE: 16 BRPM | HEART RATE: 62 BPM | HEIGHT: 68 IN | SYSTOLIC BLOOD PRESSURE: 101 MMHG | OXYGEN SATURATION: 98 % | TEMPERATURE: 98.2 F | DIASTOLIC BLOOD PRESSURE: 64 MMHG | BODY MASS INDEX: 22.19 KG/M2 | WEIGHT: 146.4 LBS

## 2019-06-28 DIAGNOSIS — R10.30 LOWER ABDOMINAL PAIN: ICD-10-CM

## 2019-06-28 DIAGNOSIS — N30.00 ACUTE CYSTITIS WITHOUT HEMATURIA: Primary | ICD-10-CM

## 2019-06-28 LAB
BILIRUB UR QL STRIP: NEGATIVE
GLUCOSE UR-MCNC: NEGATIVE MG/DL
KETONES P FAST UR STRIP-MCNC: NEGATIVE MG/DL
PH UR STRIP: 6 [PH] (ref 4.6–8)
PROT UR QL STRIP: NEGATIVE
SP GR UR STRIP: 1.02 (ref 1–1.03)
UA UROBILINOGEN AMB POC: NORMAL (ref 0.2–1)
URINALYSIS CLARITY POC: NORMAL
URINALYSIS COLOR POC: YELLOW
URINE BLOOD POC: NORMAL
URINE LEUKOCYTES POC: NORMAL
URINE NITRITES POC: NEGATIVE

## 2019-06-28 RX ORDER — CEPHALEXIN 500 MG/1
500 CAPSULE ORAL 2 TIMES DAILY
Qty: 14 CAP | Refills: 0 | Status: SHIPPED | OUTPATIENT
Start: 2019-06-28 | End: 2019-07-05

## 2019-06-28 NOTE — PATIENT INSTRUCTIONS
Urinary Tract Infection in Women: Care Instructions Your Care Instructions A urinary tract infection, or UTI, is a general term for an infection anywhere between the kidneys and the urethra (where urine comes out). Most UTIs are bladder infections. They often cause pain or burning when you urinate. UTIs are caused by bacteria and can be cured with antibiotics. Be sure to complete your treatment so that the infection goes away. Follow-up care is a key part of your treatment and safety. Be sure to make and go to all appointments, and call your doctor if you are having problems. It's also a good idea to know your test results and keep a list of the medicines you take. How can you care for yourself at home? · Take your antibiotics as directed. Do not stop taking them just because you feel better. You need to take the full course of antibiotics. · Drink extra water and other fluids for the next day or two. This may help wash out the bacteria that are causing the infection. (If you have kidney, heart, or liver disease and have to limit fluids, talk with your doctor before you increase your fluid intake.) · Avoid drinks that are carbonated or have caffeine. They can irritate the bladder. · Urinate often. Try to empty your bladder each time. · To relieve pain, take a hot bath or lay a heating pad set on low over your lower belly or genital area. Never go to sleep with a heating pad in place. To prevent UTIs · Drink plenty of water each day. This helps you urinate often, which clears bacteria from your system. (If you have kidney, heart, or liver disease and have to limit fluids, talk with your doctor before you increase your fluid intake.) · Urinate when you need to. · Urinate right after you have sex. · Change sanitary pads often. · Avoid douches, bubble baths, feminine hygiene sprays, and other feminine hygiene products that have deodorants. · After going to the bathroom, wipe from front to back. When should you call for help? Call your doctor now or seek immediate medical care if: 
  · Symptoms such as fever, chills, nausea, or vomiting get worse or appear for the first time.  
  · You have new pain in your back just below your rib cage. This is called flank pain.  
  · There is new blood or pus in your urine.  
  · You have any problems with your antibiotic medicine.  
 Watch closely for changes in your health, and be sure to contact your doctor if: 
  · You are not getting better after taking an antibiotic for 2 days.  
  · Your symptoms go away but then come back. Where can you learn more? Go to http://kirstin-andrea.info/. Enter Q249 in the search box to learn more about \"Urinary Tract Infection in Women: Care Instructions. \" Current as of: March 20, 2018 Content Version: 11.9 © 8061-9727 [employer]. Care instructions adapted under license by [employer] (which disclaims liability or warranty for this information). If you have questions about a medical condition or this instruction, always ask your healthcare professional. [address] any warranty or liability for your use of this information.

## 2019-06-28 NOTE — PROGRESS NOTES
Subjective:     Jane D Doc Medico is a 27 y.o. female who complains of dysuria, \"stabbing\" pain in in the lower abdomen, mild nausea and bloating for 1 day. Patient denies vomiting, fever, unusual vaginal discharge, unusual vaginal bleeding. Denies any diarrhea or constipation. Patient does not have a history of recurrent UTI. Patient does not have a history of pyelonephritis. Denies any concern for STI. Last BM yesterday and reported as normal.      Allergies   Allergen Reactions    Hydrogen Peroxide Rash     Past Medical History:   Diagnosis Date    Spider bite 7/2014     Past Surgical History:   Procedure Laterality Date    HX HEENT      ear tubes        Review of Systems  Pertinent items are noted in HPI. Objective:     Visit Vitals  /64 (BP 1 Location: Left arm, BP Patient Position: Sitting)   Pulse 62   Temp 98.2 °F (36.8 °C) (Oral)   Resp 16   Ht 5' 8\" (1.727 m)   Wt 146 lb 6.4 oz (66.4 kg)   LMP 06/09/2019   SpO2 98%   BMI 22.26 kg/m²     General:  alert, cooperative, no distress   Abdomen: soft, nondistended, no masses or organomegaly. Mild tenderness mid-lower abdomen. Back:  CVA tenderness absent   :  defer exam     Laboratory:   Urine dipstick shows   Results for orders placed or performed in visit on 06/28/19   AMB POC URINALYSIS DIP STICK AUTO W/O MICRO   Result Value Ref Range    Color (UA POC) Yellow     Clarity (UA POC) Cloudy     Glucose (UA POC) Negative Negative    Bilirubin (UA POC) Negative Negative    Ketones (UA POC) Negative Negative    Specific gravity (UA POC) 1.025 1.001 - 1.035    Blood (UA POC) Trace Negative    pH (UA POC) 6.0 4.6 - 8.0    Protein (UA POC) Negative Negative    Urobilinogen (UA POC) 0.2 mg/dL 0.2 - 1    Nitrites (UA POC) Negative Negative    Leukocyte esterase (UA POC) 1+ Negative         Assessment/Plan:       ICD-10-CM ICD-9-CM    1. Acute cystitis without hematuria N30.00 595.0    2.  Lower abdominal pain R10.30 789.09      ED warnings reviewed w/ pt.       1. keflex as directed pending culture  2. Maintain adequate hydration  3. May use OTC pyridium as desired, which will turn urine orange/red color  4. Follow up if symptoms not improving, and prn. Sharyn Jones NP  This note will not be viewable in [address]

## 2019-06-28 NOTE — PROGRESS NOTES
Louis Grace is a 27 y.o. female     Chief Complaint   Patient presents with    Abdominal Pain     stabbing pain started yesterday has since worsen. burning during urination. Visit Vitals  /64 (BP 1 Location: Left arm, BP Patient Position: Sitting)   Pulse 62   Temp 98.2 °F (36.8 °C) (Oral)   Resp 16   Ht 5' 8\" (1.727 m)   Wt 146 lb 6.4 oz (66.4 kg)   LMP 06/09/2019   SpO2 98%   BMI 22.26 kg/m²       Health Maintenance Due   Topic Date Due    DTaP/Tdap/Td series (7 - Td) 03/18/2018    PAP AKA CERVICAL CYTOLOGY  04/02/2018       1. Have you been to the ER, urgent care clinic since your last visit? Hospitalized since your last visit? No    2. Have you seen or consulted any other health care providers outside of the [address] [place] [address] since your last visit? Include any pap smears or colon screening.  No

## 2019-06-28 NOTE — LETTER
NOTIFICATION RETURN TO WORK / SCHOOL 
 
6/28/2019 5:36 PM 
 
Ms. [address] [facility] To Whom It May Concern: 
 
Jane KIRK Emanuel Shanks is currently under the care of [address] [place] [address]. She will return to work/school on 6/29/2019. If there are questions or concerns please have the patient contact our office. Sincerely, David Bates NP

## 2019-07-03 LAB — BACTERIA UR CULT: ABNORMAL

## 2019-07-05 ENCOUNTER — TELEPHONE (OUTPATIENT)
Dept: PRIMARY CARE CLINIC | Age: 24
End: 2019-07-05

## 2019-07-05 DIAGNOSIS — N30.01 ACUTE CYSTITIS WITH HEMATURIA: Primary | ICD-10-CM

## 2019-07-05 RX ORDER — NITROFURANTOIN 25; 75 MG/1; MG/1
100 CAPSULE ORAL 2 TIMES DAILY
Qty: 14 CAP | Refills: 0 | Status: SHIPPED | OUTPATIENT
Start: 2019-07-05 | End: 2019-07-12

## 2019-07-05 NOTE — PROGRESS NOTES
Patient message left to return call; need to inform her that based on her urine results she will need an additional antibiotic, twice daily for 7 days and that has been sent in to her [address].

## 2019-07-05 NOTE — TELEPHONE ENCOUNTER
Two pt identifiers confirmed. Pt was concerned about new antibiotic ([address]) being called into [facility]. Informed pt due to urine culture results the previous antibiotic (keflex) will not be effective in fighting off the infection. Therefore, to stop taking keflex and to start taking macrobid. Pt verbalized understanding of information discussed w/ no further questions at this time.

## 2020-06-03 ENCOUNTER — OFFICE VISIT (OUTPATIENT)
Dept: PRIMARY CARE CLINIC | Age: 25
End: 2020-06-03

## 2020-06-03 VITALS
SYSTOLIC BLOOD PRESSURE: 109 MMHG | HEART RATE: 64 BPM | BODY MASS INDEX: 21.61 KG/M2 | TEMPERATURE: 98.4 F | RESPIRATION RATE: 16 BRPM | DIASTOLIC BLOOD PRESSURE: 67 MMHG | WEIGHT: 142.6 LBS | OXYGEN SATURATION: 98 % | HEIGHT: 68 IN

## 2020-06-03 DIAGNOSIS — F43.0 ANXIETY AS ACUTE REACTION TO EXCEPTIONAL STRESS: Primary | ICD-10-CM

## 2020-06-03 DIAGNOSIS — F41.1 ANXIETY AS ACUTE REACTION TO EXCEPTIONAL STRESS: Primary | ICD-10-CM

## 2020-06-03 RX ORDER — SERTRALINE HYDROCHLORIDE 25 MG/1
25 TABLET, FILM COATED ORAL DAILY
Qty: 30 TAB | Refills: 0 | Status: SHIPPED | OUTPATIENT
Start: 2020-06-03 | End: 2020-07-03

## 2020-06-03 NOTE — PATIENT INSTRUCTIONS
Learning About Stress What is stress? Stress is what you feel when you have to handle more than you are used to. Stress is a fact of life for most people, and it affects everyone differently. What causes stress for you may not be stressful for someone else. A lot of things can cause stress. You may feel stress when you go on a job interview, take a test, or run a race. This kind of short-term stress is normal and even useful. It can help you if you need to work hard or react quickly. For example, stress can help you finish an important job on time. Stress also can last a long time. Long-term stress is caused by stressful situations or events. Examples of long-term stress include long-term health problems, ongoing problems at work, or conflicts in your family. Long-term stress can harm your health. How does stress affect your health? When you are stressed, your body responds as though you are in danger. It makes hormones that speed up your heart, make you breathe faster, and give you a burst of energy. This is called the fight-or-flight stress response. If the stress is over quickly, your body goes back to normal and no harm is done. But if stress happens too often or lasts too long, it can have bad effects. Long-term stress can make you more likely to get sick, and it can make symptoms of some diseases worse. If you tense up when you are stressed, you may develop neck, shoulder, or low back pain. Stress is linked to high blood pressure and heart disease. Stress also harms your emotional health. It can make you pena, tense, or depressed. Your relationships may suffer, and you may not do well at work or school. What can you do to manage stress? How to relax your mind · Write. It may help to write about things that are bothering you. This helps you find out how much stress you feel and what is causing it. When you know this, you can find better ways to cope. · Let your feelings out. Talk, laugh, cry, and express anger when you need to. Talking with friends, family, a counselor, or a member of the clergy about your feelings is a healthy way to relieve stress. · Do something you enjoy. For example, listen to music or go to a movie. Practice your hobby or do volunteer work. · Meditate. This can help you relax, because you are not worrying about what happened before or what may happen in the future. · Do guided imagery. Imagine yourself in any setting that helps you feel calm. You can use audiotapes, books, or a teacher to guide you. How to relax your body · Do something active. Exercise or activity can help reduce stress. Walking is a great way to get started. Even everyday activities such as housecleaning or yard work can help. · Do breathing exercises. For example: ? From a standing position, bend forward from the waist with your knees slightly bent. Let your arms dangle close to the floor. ? Breathe in slowly and deeply as you return to a standing position. Roll up slowly and lift your head last. 
? Hold your breath for just a few seconds in the standing position. ? Breathe out slowly and bend forward from the waist. 
· Try yoga or néstor chi. These techniques combine exercise and meditation. You may need some training at first to learn them. What can you do to prevent stress? · Manage your time. This helps you find time to do the things you want and need to do. · Get enough sleep. Your body recovers from the stresses of the day while you are sleeping. · Get support. Your family, friends, and community can make a difference in how you experience stress. Where can you learn more? Go to http://kirstin-andrea.info/ Enter L059 in the search box to learn more about \"Learning About Stress. \" Current as of: December 16, 2019               Content Version: 12.5 © 8317-9164 [employer]. Care instructions adapted under license by [employer] (which disclaims liability or warranty for this information). If you have questions about a medical condition or this instruction, always ask your healthcare professional. [address] any warranty or liability for your use of this information.

## 2020-06-03 NOTE — PROGRESS NOTES
Sharyn Gray is a 23 y.o. female    Room:4    Chief Complaint   Patient presents with    Anxiety     Pt c/o anxiety. Pt States \" i am having anxiety i tend to over think everything and its starting to affect me would like to get some medication for it, i wake up feeling like i have a ton of bricks on my chest, i cant focus on anything, and i got hot, dutch always over think things ill say for the past x2 months its gotten worse, maybe because im at home alot alone and now that i am back at work its not going away. \" and pt says she never took anxiety medication before. Visit Vitals  /67 (BP 1 Location: Left arm, BP Patient Position: Sitting)   Pulse 64   Temp 98.4 °F (36.9 °C) (Oral)   Resp 16   Ht 5' 8\" (1.727 m)   Wt 142 lb 9.6 oz (64.7 kg)   SpO2 98%   BMI 21.68 kg/m²       Pain Scale: 0 - No pain/10    1. Have you been to the ER, urgent care clinic since your last visit? Hospitalized since your last visit? No    2. Have you seen or consulted any other health care providers outside of the [address] [place] [address] since your last visit? Include any pap smears or colon screening.  No

## 2020-06-03 NOTE — PROGRESS NOTES
HISTORY OF PRESENT ILLNESS  Jane KIRK Tanisha Chavez is a 23 y.o. female. HPI  Patient with a longtime history of anxiety. Over the past 2 months stress and anxiety is becoming worse. \"Overthinking everything lately. \" Thinks because she has more time on her hands, working from home. Stressed 4-5 hours a day. Denies chest pain or palpitations. Occurs when she is alone. At times feels breathless. At times feels sweaty. Sleeping 6-7 hours / night. Appetite normal.  Does moderate physical activity daily. Works at [employer]. No previous history of anxiety, diagnosis of ADHD or OCD. Never been on medication. Denies depression or thoughts of self harm. Past Medical History:   Diagnosis Date    Spider bite 7/2014     Past Surgical History:   Procedure Laterality Date    HX HEENT      ear tubes     Allergies   Allergen Reactions    Hydrogen Peroxide Rash       Review of Systems   Constitutional: Negative for chills, fever, malaise/fatigue and weight loss. Eyes: Negative for photophobia. Respiratory: Negative for cough and shortness of breath. Cardiovascular: Negative for chest pain and palpitations. Gastrointestinal: Negative for abdominal pain and constipation. Musculoskeletal: Negative for joint pain and myalgias. Neurological: Positive for headaches. Negative for dizziness. Psychiatric/Behavioral: Negative for depression, hallucinations, memory loss, substance abuse and suicidal ideas. The patient is nervous/anxious. The patient does not have insomnia. Physical Exam  Vitals signs and nursing note reviewed. Constitutional:       Appearance: Normal appearance. She is normal weight. HENT:      Head: Normocephalic and atraumatic. Neck:      Musculoskeletal: Normal range of motion and neck supple. Cardiovascular:      Rate and Rhythm: Normal rate. Pulses: Normal pulses. Pulmonary:      Effort: Pulmonary effort is normal.   Skin:     General: Skin is warm and dry.    Neurological: General: No focal deficit present. Mental Status: She is alert and oriented to person, place, and time. Psychiatric:         Attention and Perception: Attention and perception normal.         Mood and Affect: Mood and affect normal.         Speech: Speech normal.         Behavior: Behavior normal. Behavior is cooperative. Thought Content: Thought content normal.         Cognition and Memory: Cognition normal.         Judgment: Judgment normal.       3 most recent PHQ Screens 6/3/2020   Little interest or pleasure in doing things Not at all   Feeling down, depressed, irritable, or hopeless Not at all   Total Score PHQ 2 0     NEDA 2/7 6/3/2020   Feeling nervous, anxious or on edge? 3   Not being able to stop or control worrying? 3   NEDA-2 Subtotal 6   Worrying too much about different things? 2   Trouble relaxing? 2   Being so restless that it is hard to sit still? 1   Becoming easily annoyed or irritable? 2   Feeling afraid as if something awful might happen? 0   NEDA-7 Total Score 13   .g  ASSESSMENT and PLAN    ICD-10-CM ICD-9-CM    1. Anxiety as acute reaction to exceptional stress F41.1 308.0     F43.0       Encounter Diagnoses   Name Primary?  Anxiety as acute reaction to exceptional stress Yes     Orders Placed This Encounter    sertraline (ZOLOFT) 25 mg tablet     reviewed medications and side effects in detail  Information printed from Up to Date on medication. Instructed patient to return in 3-4 weeks. Also, encouraged to sign for My Chart.

## 2020-07-06 ENCOUNTER — OFFICE VISIT (OUTPATIENT)
Dept: PRIMARY CARE CLINIC | Age: 25
End: 2020-07-06

## 2020-07-06 VITALS
SYSTOLIC BLOOD PRESSURE: 98 MMHG | RESPIRATION RATE: 16 BRPM | WEIGHT: 143 LBS | OXYGEN SATURATION: 99 % | HEIGHT: 68 IN | HEART RATE: 59 BPM | BODY MASS INDEX: 21.67 KG/M2 | TEMPERATURE: 97.6 F | DIASTOLIC BLOOD PRESSURE: 63 MMHG

## 2020-07-06 DIAGNOSIS — F41.9 ANXIETY: Primary | ICD-10-CM

## 2020-07-06 RX ORDER — SERTRALINE HYDROCHLORIDE 25 MG/1
25 TABLET, FILM COATED ORAL DAILY
Qty: 30 TAB | Refills: 1 | Status: SHIPPED | OUTPATIENT
Start: 2020-07-06 | End: 2020-09-04

## 2020-07-06 RX ORDER — SERTRALINE HYDROCHLORIDE 25 MG/1
TABLET, FILM COATED ORAL DAILY
COMMUNITY
End: 2020-07-06 | Stop reason: SDUPTHER

## 2020-07-06 NOTE — PROGRESS NOTES
RM 1    Chief Complaint   Patient presents with    Medication Evaluation     med follow up       Visit Vitals  BP 98/63 (BP 1 Location: Left arm, BP Patient Position: Sitting)   Pulse (!) 59   Temp 97.6 °F (36.4 °C) (Skin)   Resp 16   Ht 5' 8\" (1.727 m)   Wt 143 lb (64.9 kg)   SpO2 99%   BMI 21.74 kg/m²

## 2020-07-06 NOTE — PROGRESS NOTES
HISTORY OF PRESENT ILLNESS  Yolies YELENA Concepcion Fraire is a 23 y.o. female. Patient here for 1 month follow up after starting SSRI for anxiety. Feels like work is going better. 'Things roll off my back\" and I can move on. \" Reports no SE from meds. Would like to stay on current dose. Has not found PCP yet. To manage care. Medication Evaluation   The history is provided by the patient. This is a chronic problem. The current episode started more than 1 week ago. The problem has been rapidly improving. Pertinent negatives include no chest pain, no abdominal pain, no headaches and no shortness of breath. Anxiety   Pertinent negatives include no chest pain, no abdominal pain, no headaches and no shortness of breath. Past Medical History:   Diagnosis Date    Spider bite 7/2014     Past Surgical History:   Procedure Laterality Date    HX HEENT      ear tubes     Allergies   Allergen Reactions    Hydrogen Peroxide Rash       Review of Systems   Respiratory: Negative for shortness of breath. Cardiovascular: Negative for chest pain. Gastrointestinal: Negative for abdominal pain. Neurological: Negative for headaches. Physical Exam  Constitutional:       Appearance: Normal appearance. She is normal weight. HENT:      Head: Normocephalic and atraumatic. Eyes:      Pupils: Pupils are equal, round, and reactive to light. Cardiovascular:      Rate and Rhythm: Normal rate. Pulses: Normal pulses. Pulmonary:      Effort: Pulmonary effort is normal.   Skin:     General: Skin is warm. Neurological:      General: No focal deficit present. Mental Status: She is alert. Psychiatric:         Mood and Affect: Mood normal.         Thought Content: Thought content normal.         Judgment: Judgment normal.         ASSESSMENT and PLAN    ICD-10-CM ICD-9-CM    1. Anxiety F41.9 300.00      Encounter Diagnoses   Name Primary?     Anxiety Yes     Orders Placed This Encounter    DISCONTD: sertraline (Zoloft) 25 mg tablet    sertraline (Zoloft) 25 mg tablet   Information given for PCP  Medication as directed  It was a pleasure to see you in the office today. Please call if you have any further questions or concerns. I am available through the portal system.      Signed By: WOJCIECH Morales     July 6, 2020

## 2020-07-06 NOTE — PATIENT INSTRUCTIONS

## 2020-10-08 ENCOUNTER — OFFICE VISIT (OUTPATIENT)
Dept: URGENT CARE | Age: 25
End: 2020-10-08
Payer: COMMERCIAL

## 2020-10-08 VITALS — OXYGEN SATURATION: 99 % | RESPIRATION RATE: 16 BRPM | HEART RATE: 71 BPM | TEMPERATURE: 99 F

## 2020-10-08 DIAGNOSIS — R05.9 COUGH: ICD-10-CM

## 2020-10-08 DIAGNOSIS — R06.2 WHEEZE: Primary | ICD-10-CM

## 2020-10-08 PROCEDURE — S9083 URGENT CARE CENTER GLOBAL: HCPCS | Performed by: NURSE PRACTITIONER

## 2020-10-08 RX ORDER — ALBUTEROL SULFATE 90 UG/1
2 AEROSOL, METERED RESPIRATORY (INHALATION)
Qty: 1 INHALER | Refills: 0 | Status: SHIPPED | OUTPATIENT
Start: 2020-10-08

## 2020-10-08 RX ORDER — AZITHROMYCIN 250 MG/1
TABLET, FILM COATED ORAL
Qty: 6 TAB | Refills: 0 | Status: SHIPPED | OUTPATIENT
Start: 2020-10-08

## 2020-10-08 NOTE — LETTER
NOTIFICATION RETURN TO WORK / SCHOOL 
 
10/8/2020 6:21 PM 
 
Ms. [address] [facility] [address] [facility] To Whom It May Concern: 
 
Jane Murillo is currently under the care of [address] Please allow to quarantine/self isolate until: 10/17/2020 If there are questions or concerns please have the patient contact our office. Sincerely, GHE PROVIDER

## 2020-10-08 NOTE — PROGRESS NOTES
Subjective: (As above and below)       This patient was seen in [facility] at [address] [place] [address] Urgent [facility] while outdoors at their vehicle due to COVID-19 pandemic with PPE and focused examination in order to decrease community viral transmission. The patient/guardian gave verbal consent to treat. Chief Complaint   Patient presents with    Concern For COVID-19 (Coronavirus)     cough, sore throat x 2 days     Jane Mayers is a 23 y.o. female who presents for evaluation of : runny nose, dry tight cough, malaise, mild SOB with coughing and loose stool. Symptom onset 2 days ago Preceding illness: none. Has tried OTC meds without relief. No other identified aggravating or alleviating factors. Symptoms are constant and overall worsening. Promotes no decrease in PO intake of fluids. Denies: severe lethargy, SOB, syncope/near syncope, vomiting/diarrhea, chest pain, chest pain with breathing, labored breathing, severe headache, non-intractable fevers . Recent travel: no  Known Exposure to COVID-19: no  Known flu or strep contact: no           Review of Systems - negative except as listed above    Reviewed PmHx, RxHx, FmHx, SocHx, AllgHx and updated in chart.   Family History   Problem Relation Age of Onset    Asthma Mother     Elevated Lipids Father     No Known Problems Sister     No Known Problems Brother     No Known Problems Maternal Aunt     No Known Problems Maternal Uncle     No Known Problems Paternal Aunt     No Known Problems Paternal Uncle     Diabetes Maternal Grandmother     Hypertension Maternal Grandfather     Hypertension Paternal Grandmother        Past Medical History:   Diagnosis Date    Spider bite 7/2014      Social History     Socioeconomic History    Marital status: SINGLE     Spouse name: Not on file    Number of children: Not on file    Years of education: Not on file    Highest education level: Not on file   Tobacco Use    Smoking status: Never Smoker    Smokeless tobacco: Never Used   Substance and Sexual Activity    Alcohol use: Yes     Comment: occasional    Drug use: No    Sexual activity: Yes     Birth control/protection: I.U.D. Current Outpatient Medications   Medication Sig    azithromycin (ZITHROMAX) 250 mg tablet Take 2 tablets on day 1 then 1 tablet per day for remaining 4 days. Take by mouth.  albuterol (PROVENTIL HFA, VENTOLIN HFA, PROAIR HFA) 90 mcg/actuation inhaler Take 2 Puffs by inhalation every four (4) hours as needed for Wheezing. No current facility-administered medications for this visit. Objective:     Vitals:    10/08/20 1805   Pulse: 71   Resp: 16   Temp: 99 °F (37.2 °C)   SpO2: 99%       Physical Exam  General appearance  appears well hydrated and does not appear toxic, no acute distress  Eyes - EOMs intact. Non injected. No scleral icterus   Ears - no external swelling  Nose -nasal congestion present. No purulent drainage  Mouth - OP mild erythema without swelling, exudate or lesion. Mucus membranes moist. Uvula midline. Neck/Lymphatics  trachea midline, full AROM  Chest - few soft scattered wheezes bilat. No rales or rhonchi. Heart - RRR no murmurs  Skin - no observable rashes or pallor  Neurologic- alert and oriented x 3  Psychiatric- normal mood, behavior and though content. Assessment/ Plan:     1. Wheeze  Plan:  Differential diagnosis includes: viral URI, COVID-19, bronchitis, sinusitis, seasonal allergies, reactive airway disease  No evidence of complication of illness at this time. Supportive home care- maintain adequate fluid intake, lozenges, over the counter Tylenol.   Patient is aware that the differential includes COVID-19 and was advised the following: self isolation based on current CDC guidelines, avoiding high risk individuals, washing hands frequently, avoid touching face, eyes or mucus membranes, wearing surgical mask if they are in public to reduce transmission to others.  - NOVEL CORONAVIRUS (COVID-19)  - albuterol (PROVENTIL HFA, VENTOLIN HFA, PROAIR HFA) 90 mcg/actuation inhaler; Take 2 Puffs by inhalation every four (4) hours as needed for Wheezing. Dispense: 1 Inhaler; Refill: 0    2. Cough    - NOVEL CORONAVIRUS (COVID-19)  - azithromycin (ZITHROMAX) 250 mg tablet; Take 2 tablets on day 1 then 1 tablet per day for remaining 4 days. Take by mouth. Dispense: 6 Tab; Refill: 0  - albuterol (PROVENTIL HFA, VENTOLIN HFA, PROAIR HFA) 90 mcg/actuation inhaler; Take 2 Puffs by inhalation every four (4) hours as needed for Wheezing. Dispense: 1 Inhaler; Refill: 0          Follow up: We have reviewed, in detail, particular presentations/worsening/concerning signs and symptoms that may warrant seeking immediate care in the ED setting and patient verbalized being aware of what to watch for. For other non-severe changes, non-improvement or questions, patient aware to contact PCP office or consider a virtual online medical consultation. Medication Side Effects, Adverse Effects, Risks, Benefits and Warnings and how to take medication properly were discussed with patient: yes    Reviewed with her that COVID-19 pandemic is an evolving situation with rapidly changing recommendations & guidelines. Medical decisions are made based on the the best information available at the time.   Recommended she stay tuned for updates published by trusted sources and to advise your PCP of any unexpected changes in clinical condition     Smith Hernandez NP

## 2020-10-10 LAB — SARS-COV-2, NAA: NOT DETECTED

## 2022-03-19 PROBLEM — L30.9 ECZEMA: Status: ACTIVE | Noted: 2017-01-10

## 2023-05-29 RX ORDER — AZITHROMYCIN 250 MG/1
TABLET, FILM COATED ORAL
COMMUNITY
Start: 2020-10-08

## 2023-05-29 RX ORDER — ALBUTEROL SULFATE 90 UG/1
2 AEROSOL, METERED RESPIRATORY (INHALATION) EVERY 4 HOURS PRN
COMMUNITY
Start: 2020-10-08